# Patient Record
Sex: MALE | Race: WHITE | NOT HISPANIC OR LATINO | Employment: FULL TIME | URBAN - METROPOLITAN AREA
[De-identification: names, ages, dates, MRNs, and addresses within clinical notes are randomized per-mention and may not be internally consistent; named-entity substitution may affect disease eponyms.]

---

## 2023-09-22 ENCOUNTER — OFFICE VISIT (OUTPATIENT)
Dept: URGENT CARE | Facility: CLINIC | Age: 33
End: 2023-09-22
Payer: COMMERCIAL

## 2023-09-22 VITALS
WEIGHT: 195.2 LBS | HEART RATE: 86 BPM | DIASTOLIC BLOOD PRESSURE: 80 MMHG | TEMPERATURE: 97 F | RESPIRATION RATE: 16 BRPM | SYSTOLIC BLOOD PRESSURE: 120 MMHG | BODY MASS INDEX: 23.77 KG/M2 | OXYGEN SATURATION: 100 % | HEIGHT: 76 IN

## 2023-09-22 DIAGNOSIS — N45.1 EPIDIDYMITIS: Primary | ICD-10-CM

## 2023-09-22 PROCEDURE — 87491 CHLMYD TRACH DNA AMP PROBE: CPT | Performed by: PHYSICIAN ASSISTANT

## 2023-09-22 PROCEDURE — 99213 OFFICE O/P EST LOW 20 MIN: CPT | Performed by: PHYSICIAN ASSISTANT

## 2023-09-22 PROCEDURE — 87591 N.GONORRHOEAE DNA AMP PROB: CPT | Performed by: PHYSICIAN ASSISTANT

## 2023-09-22 PROCEDURE — 87086 URINE CULTURE/COLONY COUNT: CPT | Performed by: PHYSICIAN ASSISTANT

## 2023-09-22 RX ORDER — LEVOFLOXACIN 500 MG/1
500 TABLET, FILM COATED ORAL EVERY 24 HOURS
Qty: 10 TABLET | Refills: 0 | Status: SHIPPED | OUTPATIENT
Start: 2023-09-22 | End: 2023-10-02

## 2023-09-22 NOTE — PROGRESS NOTES
[facility]        NAME: Katarzyna Kimble is a 26 y.o. male  : 1990    MRN: [id number]  DATE: 2023  TIME: 11:35 AM    Assessment and Plan   Epididymitis [N45.1]  1. Epididymitis  Urine culture    Chlamydia/GC amplified DNA by PCR    levofloxacin (LEVAQUIN) 500 mg tablet    Ambulatory Referral to Urology        based on the patient's history and symptoms I suspect epididymitis. However, due to the firmness on exam will refer to urology for evaluation to rule out possible mass or other entities. Patient knows the importance of going to the ER immediately for any worsening symptoms and to follow-up with urology promptly. Patient Instructions     1. Increase oral fluids. 2.  Antibiotics as prescribed. 3.  Follow-up with urology as soon as possible: A referral has been placed for you. 4.  Go to the ER immediately for any worsening symptoms. Chief Complaint     Chief Complaint   Patient presents with   • yellowish semen     A yellowish semen that started 2 weeks ago, and now becomes clear, watery in consistency which he noticed yesterday. 2 days ago, he noticed a lump on his right testicles,(pt not sure if the lump has been there for quite some time), feel a bit sore. History of Present Illness       20-year-old male patient with an approximate 2-week history of varying qualities of his semen initially seeming thick and yellow and now turning more thin than usual and white in color. Denies any hard particles in his ejaculate. Patient states prior to the symptoms beginning he had approximately 1 week of urinary frequency, urgency, hesitancy. No report of urethral discharge. No history of insertive anal intercourse. Patient had 1 episode of unprotected intercourse with a female approximately 3 weeks prior to the symptoms beginning.   Patient states now in the last week he has noticed a hard, enlarged area in the superoposterior aspect of the right testicle which is mild to moderately sensitive to touch. Denies any current urinary symptoms. Denies any hematospermia or hematuria. Review of Systems   Review of Systems   Constitutional: Negative for chills and fever. HENT: Negative for ear pain and sore throat. Eyes: Negative for pain and visual disturbance. Respiratory: Negative for cough and shortness of breath. Cardiovascular: Negative for chest pain and palpitations. Gastrointestinal: Negative for abdominal pain and vomiting. Genitourinary: Positive for testicular pain. Negative for dysuria, flank pain, hematuria, penile discharge, penile pain and penile swelling. Musculoskeletal: Negative for arthralgias and back pain. Skin: Negative for color change and rash. Neurological: Negative for seizures and syncope. All other systems reviewed and are negative. Current Medications       Current Outpatient Medications:   •  levofloxacin (LEVAQUIN) 500 mg tablet, Take 1 tablet (500 mg total) by mouth every 24 hours for 10 days, Disp: 10 tablet, Rfl: 0    Current Allergies     Allergies as of 09/22/2023   • (No Known Allergies)            The following portions of the patient's history were reviewed and updated as appropriate: allergies, current medications, past family history, past medical history, past social history, past surgical history and problem list.     History reviewed. No pertinent past medical history. History reviewed. No pertinent surgical history. History reviewed. No pertinent family history. Medications have been verified. Objective   /80   Pulse 86   Temp (!) 97 °F (36.1 °C)   Resp 16   Ht 6' 4" (1.93 m)   Wt 88.5 kg (195 lb 3.2 oz)   SpO2 100%   BMI 23.76 kg/m²        Physical Exam     Physical Exam  Vitals and nursing note reviewed. Constitutional:       General: He is not in acute distress. Appearance: Normal appearance. He is not ill-appearing. HENT:      Head: Normocephalic.       Nose: Nose normal.      Mouth/Throat:      Mouth: Mucous membranes are moist.      Pharynx: Oropharynx is clear. Eyes:      Conjunctiva/sclera: Conjunctivae normal.      Pupils: Pupils are equal, round, and reactive to light. Cardiovascular:      Rate and Rhythm: Normal rate and regular rhythm. Pulses: Normal pulses. Pulmonary:      Effort: Pulmonary effort is normal.      Breath sounds: Normal breath sounds. Abdominal:      Tenderness: There is no abdominal tenderness. There is no right CVA tenderness or left CVA tenderness. Genitourinary:     Comments: Right testicle is generally slightly larger than the left but normal consistency. However, epididymis feels firm, enlarged, mild to moderately tender. No scrotal skin changes. Musculoskeletal:         General: Normal range of motion. Cervical back: Normal range of motion and neck supple. Skin:     General: Skin is warm and dry. Capillary Refill: Capillary refill takes less than 2 seconds. Neurological:      Mental Status: He is alert and oriented to person, place, and time.    Psychiatric:         Mood and Affect: Mood normal.         Behavior: Behavior normal.

## 2023-09-22 NOTE — PATIENT INSTRUCTIONS
1.  Increase oral fluids. 2.  Antibiotics as prescribed. 3.  Follow-up with urology as soon as possible: A referral has been placed for you. 4.  Go to the ER immediately for any worsening symptoms.

## 2023-09-23 LAB — BACTERIA UR CULT: NORMAL

## 2023-09-24 LAB
C TRACH DNA SPEC QL NAA+PROBE: POSITIVE
N GONORRHOEA DNA SPEC QL NAA+PROBE: NEGATIVE